# Patient Record
Sex: FEMALE | Race: WHITE | NOT HISPANIC OR LATINO | Employment: FULL TIME | URBAN - NONMETROPOLITAN AREA
[De-identification: names, ages, dates, MRNs, and addresses within clinical notes are randomized per-mention and may not be internally consistent; named-entity substitution may affect disease eponyms.]

---

## 2023-04-19 DIAGNOSIS — Z78.9 USES CONTRACEPTION: Primary | ICD-10-CM

## 2023-04-19 DIAGNOSIS — Z78.9 USES CONTRACEPTION: ICD-10-CM

## 2023-04-19 RX ORDER — DROSPIRENONE AND ETHINYL ESTRADIOL 0.03MG-3MG
1 KIT ORAL DAILY
Qty: 84 TABLET | Refills: 3 | Status: SHIPPED | OUTPATIENT
Start: 2023-04-19 | End: 2023-07-05 | Stop reason: SDUPTHER

## 2023-04-19 RX ORDER — DROSPIRENONE AND ETHINYL ESTRADIOL 0.03MG-3MG
1 KIT ORAL DAILY
Qty: 84 TABLET | Refills: 3 | Status: SHIPPED | OUTPATIENT
Start: 2023-04-19 | End: 2023-04-19 | Stop reason: SDUPTHER

## 2023-05-31 ENCOUNTER — OFFICE VISIT (OUTPATIENT)
Dept: PRIMARY CARE | Facility: CLINIC | Age: 43
End: 2023-05-31
Payer: COMMERCIAL

## 2023-05-31 VITALS
DIASTOLIC BLOOD PRESSURE: 78 MMHG | HEART RATE: 100 BPM | TEMPERATURE: 98 F | RESPIRATION RATE: 14 BRPM | HEIGHT: 63 IN | OXYGEN SATURATION: 97 % | BODY MASS INDEX: 32.05 KG/M2 | WEIGHT: 180.9 LBS | SYSTOLIC BLOOD PRESSURE: 135 MMHG

## 2023-05-31 DIAGNOSIS — E66.09 CLASS 1 OBESITY DUE TO EXCESS CALORIES WITHOUT SERIOUS COMORBIDITY WITH BODY MASS INDEX (BMI) OF 32.0 TO 32.9 IN ADULT: ICD-10-CM

## 2023-05-31 DIAGNOSIS — Z00.00 ENCOUNTER FOR WELLNESS EXAMINATION IN ADULT: Primary | ICD-10-CM

## 2023-05-31 DIAGNOSIS — Z12.4 SCREENING FOR CERVICAL CANCER: ICD-10-CM

## 2023-05-31 DIAGNOSIS — Z12.31 VISIT FOR SCREENING MAMMOGRAM: ICD-10-CM

## 2023-05-31 PROBLEM — E66.9 OBESITY, CLASS I, BMI 30-34.9: Status: ACTIVE | Noted: 2020-10-26

## 2023-05-31 PROBLEM — N92.0 MENORRHAGIA: Status: ACTIVE | Noted: 2023-05-31

## 2023-05-31 PROBLEM — E66.811 OBESITY, CLASS I, BMI 30-34.9: Status: ACTIVE | Noted: 2020-10-26

## 2023-05-31 PROBLEM — E66.9 OBESITY, CLASS I, BMI 30-34.9: Status: RESOLVED | Noted: 2020-10-26 | Resolved: 2023-05-31

## 2023-05-31 PROBLEM — E66.811 OBESITY, CLASS I, BMI 30-34.9: Status: RESOLVED | Noted: 2020-10-26 | Resolved: 2023-05-31

## 2023-05-31 PROBLEM — J30.9 ALLERGIC RHINITIS: Status: ACTIVE | Noted: 2023-05-31

## 2023-05-31 PROCEDURE — 87624 HPV HI-RISK TYP POOLED RSLT: CPT

## 2023-05-31 PROCEDURE — 3008F BODY MASS INDEX DOCD: CPT | Performed by: FAMILY MEDICINE

## 2023-05-31 PROCEDURE — 99396 PREV VISIT EST AGE 40-64: CPT | Performed by: FAMILY MEDICINE

## 2023-05-31 PROCEDURE — 88175 CYTOPATH C/V AUTO FLUID REDO: CPT

## 2023-05-31 PROCEDURE — 1036F TOBACCO NON-USER: CPT | Performed by: FAMILY MEDICINE

## 2023-05-31 RX ORDER — FLUTICASONE PROPIONATE 50 MCG
2 SPRAY, SUSPENSION (ML) NASAL
COMMUNITY
Start: 2020-02-01 | End: 2024-01-24

## 2023-05-31 RX ORDER — CALC/MAG/B COMPLEX/D3/HERB 61
15 TABLET ORAL
COMMUNITY
Start: 2020-10-26

## 2023-05-31 RX ORDER — CETIRIZINE HYDROCHLORIDE 10 MG/1
10 TABLET ORAL DAILY
COMMUNITY

## 2023-05-31 ASSESSMENT — PATIENT HEALTH QUESTIONNAIRE - PHQ9
SUM OF ALL RESPONSES TO PHQ9 QUESTIONS 1 AND 2: 0
2. FEELING DOWN, DEPRESSED OR HOPELESS: NOT AT ALL
1. LITTLE INTEREST OR PLEASURE IN DOING THINGS: NOT AT ALL

## 2023-05-31 NOTE — PROGRESS NOTES
"Subjective   Patient ID: Candice Javier is a 42 y.o. female who presents for Annual Exam.    HPI   Diet: well rounded, healthy  Supplements/vitamins: MV, elderberry  Alcohol use: Y, 1-2 drinks/week  Caffeine intake: 1 cup coffee/day  Exercise: active, nothing formal  Sleep: no issues  Last dental appointment: every 6mo  Last eye appointment: wears contact lenses, glasses. 1 year ago.   Anxiety/Depression: denies   LMP: 5/15/2023  Menstrual cycles: regular monthly periods  Contraception: OCPs  Last pap smear: 2020 negative   Mammogram: due   Fmhx breast cancer: Y, maternal great aunt  Cscope: n/a  Fmhx colon cancer: Y, maternal gma   Tobacco use/Lung cancer screening: N  Recreational drug use: N  Immunizations: up to date     Review of Systems   All other systems reviewed and are negative.    Objective   /78 (BP Location: Left arm, Patient Position: Sitting, BP Cuff Size: Large adult)   Pulse 100   Temp 36.7 °C (98 °F) (Temporal)   Resp 14   Ht 1.6 m (5' 3\")   Wt 82.1 kg (180 lb 14.4 oz)   LMP 05/15/2023   SpO2 97%   BMI 32.04 kg/m²     Physical Exam  Constitutional: Well developed, well nourished, alert and in no acute distress.  Head and Face: Normocephalic, atraumatic.  Eyes: Normal external exam. Pupils equally round and reactive to light with normal accommodation and extraocular movements intact.   ENT: External inspection of ears normal, tympanic membranes visualized and normal. Nasal mucosa, septum, and turbinates normal. Oral mucosa moist, oropharynx clear.   Neck: Supple, no lymphadenopathy or masses. Thyroid not enlarged, no palpable nodules.   Cardiovascular: Regular rate and rhythm, normal S1 and S2, no murmurs, gallops, or rubs. Radial pulses normal. No peripheral edema. No carotid bruits.   Pulmonary: No respiratory distress, lungs clear to auscultation bilaterally. No wheezes, rhonchi, rales.   Breasts: Normal appearance, no palpable masses or axillary lymphadenopathy, nipple discharge or " inversion, skin changes.   Abdomen: Soft, nontender, nondistended, normal bowel sounds. No masses palpated.   Genitourinary: External genitalia and vagina normal. Urethra normal. No suprapubic tenderness to palpation. No vaginal discharge or bleeding. Cervix normal. Uterus normal, no enlargement or masses palpated. Adnexa normal, no enlargement or masses palpated.  Musculoskeletal: Gait normal. Muscle strength/tone normal of all 4 extremities. Normal range of motion of all extremities.   Skin: Warm, well perfused, normal skin turgor and color, no lesions or rashes noted.   Neurologic: Cranial nerves II-XII grossly intact. Deep tendon reflexes were 2+ and symmetric. Sensation normal bilaterally.   Psychiatric: Mood calm and affect normal.      Assessment/Plan   Recommendations for women annual wellness exam:   Make sure screenings for cervical and breast cancer are up to date if applicable- pap smears age 71-24-ykexwfgea today.   Discuss mammogram starting at age 40 or sooner if positive family history of breast cancer-ordered.  STD screening   Follow a healthy diet (Dash diet, Mediterranean diet)  Exercise 150 min/wk   Maintain healthy weight (BMI < 25)-your BMI is 32.   Do not smoke   Alcohol in moderation (up to 1 drink/day)  Get enough sleep (7-8 hours/night)  Take a prenatal vitamin with folic acid if possibility of pregnancy   Make sure immunizations are up to date (influenza, Tdap)-up to date.   Premenopausal women need minimum 1,000 mg calcium and 600-800 IU vitamin D daily (combination of diet + supplement)  Talk to your physician if you have concerns about depression or anxiety  Visit dentist twice yearly  Colon Cancer Screening-n/a    Fasting labs ordered      Follow up in 1 year for CPE

## 2023-06-09 LAB
COMPLETE PATHOLOGY REPORT: NORMAL
CONVERTED CLINICAL DIAGNOSIS-HISTORY: NORMAL
CONVERTED DIAGNOSIS COMMENT: NORMAL
CONVERTED FINAL DIAGNOSIS: NORMAL
CONVERTED FINAL REPORT PDF LINK TO COPY AND PASTE: NORMAL

## 2023-07-05 DIAGNOSIS — Z78.9 USES CONTRACEPTION: ICD-10-CM

## 2023-07-06 RX ORDER — DROSPIRENONE AND ETHINYL ESTRADIOL 0.03MG-3MG
1 KIT ORAL DAILY
Qty: 84 TABLET | Refills: 3 | Status: SHIPPED | OUTPATIENT
Start: 2023-07-06 | End: 2023-07-08

## 2023-07-07 ENCOUNTER — TELEPHONE (OUTPATIENT)
Dept: PRIMARY CARE | Facility: CLINIC | Age: 43
End: 2023-07-07

## 2023-07-07 DIAGNOSIS — N92.0 MENORRHAGIA WITH REGULAR CYCLE: Primary | ICD-10-CM

## 2023-07-08 RX ORDER — DROSPIRENONE AND ETHINYL ESTRADIOL 0.03MG-3MG
1 KIT ORAL DAILY
Qty: 84 TABLET | Refills: 3 | Status: SHIPPED | OUTPATIENT
Start: 2023-07-08 | End: 2024-06-08

## 2023-09-14 DIAGNOSIS — R92.8 ABNORMAL MAMMOGRAM: ICD-10-CM

## 2023-09-14 DIAGNOSIS — N63.10 MASS OF RIGHT BREAST, UNSPECIFIED QUADRANT: Primary | ICD-10-CM

## 2023-10-09 ENCOUNTER — ANCILLARY PROCEDURE (OUTPATIENT)
Dept: RADIOLOGY | Facility: CLINIC | Age: 43
End: 2023-10-09
Payer: COMMERCIAL

## 2023-10-09 DIAGNOSIS — N63.10 MASS OF RIGHT BREAST, UNSPECIFIED QUADRANT: ICD-10-CM

## 2023-10-09 DIAGNOSIS — R92.8 ABNORMAL MAMMOGRAM: ICD-10-CM

## 2023-10-09 PROCEDURE — 77065 DX MAMMO INCL CAD UNI: CPT | Mod: LT

## 2023-10-09 PROCEDURE — 76642 ULTRASOUND BREAST LIMITED: CPT | Mod: LEFT SIDE | Performed by: RADIOLOGY

## 2023-10-09 PROCEDURE — 76642 ULTRASOUND BREAST LIMITED: CPT | Mod: RT

## 2023-10-09 PROCEDURE — 77066 DX MAMMO INCL CAD BI: CPT | Mod: LEFT SIDE | Performed by: RADIOLOGY

## 2023-10-09 PROCEDURE — 77062 BREAST TOMOSYNTHESIS BI: CPT | Mod: LEFT SIDE | Performed by: RADIOLOGY

## 2023-10-09 PROCEDURE — 76982 USE 1ST TARGET LESION: CPT | Mod: LEFT SIDE | Performed by: RADIOLOGY

## 2023-10-11 DIAGNOSIS — N63.10 MASS OF RIGHT BREAST, UNSPECIFIED QUADRANT: Primary | ICD-10-CM

## 2024-01-24 ENCOUNTER — OFFICE VISIT (OUTPATIENT)
Dept: PRIMARY CARE | Facility: CLINIC | Age: 44
End: 2024-01-24
Payer: COMMERCIAL

## 2024-01-24 VITALS
SYSTOLIC BLOOD PRESSURE: 118 MMHG | TEMPERATURE: 98.6 F | DIASTOLIC BLOOD PRESSURE: 73 MMHG | BODY MASS INDEX: 31.28 KG/M2 | HEART RATE: 104 BPM | OXYGEN SATURATION: 97 % | WEIGHT: 176.6 LBS

## 2024-01-24 DIAGNOSIS — J01.10 ACUTE NON-RECURRENT FRONTAL SINUSITIS: Primary | ICD-10-CM

## 2024-01-24 PROCEDURE — 3008F BODY MASS INDEX DOCD: CPT | Performed by: FAMILY MEDICINE

## 2024-01-24 PROCEDURE — 1036F TOBACCO NON-USER: CPT | Performed by: FAMILY MEDICINE

## 2024-01-24 PROCEDURE — 99215 OFFICE O/P EST HI 40 MIN: CPT | Performed by: FAMILY MEDICINE

## 2024-01-24 RX ORDER — DOXYCYCLINE 100 MG/1
100 CAPSULE ORAL 2 TIMES DAILY
Qty: 20 CAPSULE | Refills: 0 | Status: SHIPPED | OUTPATIENT
Start: 2024-01-24 | End: 2024-02-03

## 2024-01-24 ASSESSMENT — ENCOUNTER SYMPTOMS
DYSURIA: 0
COUGH: 1
ABDOMINAL PAIN: 0
NAUSEA: 0
DIARRHEA: 0
SORE THROAT: 0
WHEEZING: 0
VOMITING: 0
FEVER: 1
HEADACHES: 1

## 2024-01-24 NOTE — PROGRESS NOTES
" Subjective   Patient ID: Deja Javier \"Hong" is a 43 y.o. female who presents for URI (Congestion, Ear Pain, Cough, Fever, Chills. Has been going on since Monday ).  Fever   The current episode started in the past 7 days. The problem occurs constantly. The problem has been unchanged. The maximum temperature noted was 101 to 101.9 F. The temperature was taken using a tympanic thermometer. Associated symptoms include congestion, coughing, ear pain, headaches, muscle aches and sleepiness. Pertinent negatives include no abdominal pain, chest pain, diarrhea, nausea, rash, sore throat, urinary pain, vomiting or wheezing.     Sxs as in nurse note.   Not feeling well.  No known ill contacts. Taking mucinex, decongestant, not helping.   Coughing up a lot of phlegm. Ears hurt on/off   Review of Systems   Constitutional:  Positive for fever.   HENT:  Positive for congestion and ear pain. Negative for sore throat.    Respiratory:  Positive for cough. Negative for wheezing.    Cardiovascular:  Negative for chest pain.   Gastrointestinal:  Negative for abdominal pain, diarrhea, nausea and vomiting.   Genitourinary:  Negative for dysuria.   Skin:  Negative for rash.   Neurological:  Positive for headaches.       Objective   /73 (BP Location: Right arm, Patient Position: Sitting, BP Cuff Size: Adult)   Pulse 104   Temp 37 °C (98.6 °F) (Temporal)   Wt 80.1 kg (176 lb 9.6 oz)   SpO2 97%   BMI 31.28 kg/m²     Physical Exam  Constitutional:       Appearance: She is not ill-appearing.   HENT:      Head:      Comments: Facial pain left maxillary, bilateral frontal.      Ears:      Comments: Fluid behind ear drums bilaterally      Nose: Congestion present.      Mouth/Throat:      Pharynx: Posterior oropharyngeal erythema present. No oropharyngeal exudate.   Eyes:      Extraocular Movements: Extraocular movements intact.      Conjunctiva/sclera: Conjunctivae normal.      Pupils: Pupils are equal, round, and reactive to " light.   Cardiovascular:      Rate and Rhythm: Normal rate and regular rhythm.   Pulmonary:      Effort: No respiratory distress.      Breath sounds: No wheezing or rales.   Musculoskeletal:      Cervical back: No rigidity.   Lymphadenopathy:      Cervical: Cervical adenopathy present.   Neurological:      Mental Status: She is alert.         Assessment/Plan   Problem List Items Addressed This Visit    None  Visit Diagnoses       Acute non-recurrent frontal sinusitis    -  Primary    Relevant Medications    doxycycline (Vibramycin) 100 mg capsule        Recommend use flonase regularly.  Decrease mucinex to once a day, during day       Followup if not improving.     FRANCOISE Goins MD

## 2024-01-24 NOTE — PROGRESS NOTES
Answers submitted by the patient for this visit:  Fever Questionnaire (Submitted on 1/24/2024)  Chief Complaint: Fever  Onset: in the past 7 days  Frequency: constantly  Progression since onset: unchanged  Max temp prior to arrival: 101 to 101.9 F  Temperature source: a tympanic thermometer  abdominal pain: No  chest pain: No  congestion: Yes  cough: Yes  diarrhea: No  ear pain: Yes  headaches: Yes  muscle aches: Yes  nausea: No  rash: No  sleepiness: Yes  sore throat: No  urinary pain: No  vomiting: No  wheezing: No

## 2024-04-08 ENCOUNTER — HOSPITAL ENCOUNTER (OUTPATIENT)
Dept: RADIOLOGY | Facility: CLINIC | Age: 44
Discharge: HOME | End: 2024-04-08
Payer: COMMERCIAL

## 2024-04-08 ENCOUNTER — LAB (OUTPATIENT)
Dept: LAB | Facility: LAB | Age: 44
End: 2024-04-08
Payer: COMMERCIAL

## 2024-04-08 DIAGNOSIS — N63.10 MASS OF RIGHT BREAST, UNSPECIFIED QUADRANT: Primary | ICD-10-CM

## 2024-04-08 DIAGNOSIS — N63.10 MASS OF RIGHT BREAST, UNSPECIFIED QUADRANT: ICD-10-CM

## 2024-04-08 DIAGNOSIS — Z00.00 ENCOUNTER FOR WELLNESS EXAMINATION IN ADULT: ICD-10-CM

## 2024-04-08 LAB
ALBUMIN SERPL BCP-MCNC: 3.7 G/DL (ref 3.4–5)
ALP SERPL-CCNC: 75 U/L (ref 33–110)
ALT SERPL W P-5'-P-CCNC: 10 U/L (ref 7–45)
ANION GAP SERPL CALC-SCNC: 14 MMOL/L (ref 10–20)
AST SERPL W P-5'-P-CCNC: 10 U/L (ref 9–39)
BILIRUB SERPL-MCNC: 0.2 MG/DL (ref 0–1.2)
BUN SERPL-MCNC: 12 MG/DL (ref 6–23)
CALCIUM SERPL-MCNC: 9 MG/DL (ref 8.6–10.6)
CHLORIDE SERPL-SCNC: 106 MMOL/L (ref 98–107)
CHOLEST SERPL-MCNC: 164 MG/DL (ref 0–199)
CHOLESTEROL/HDL RATIO: 2.3
CO2 SERPL-SCNC: 27 MMOL/L (ref 21–32)
CREAT SERPL-MCNC: 0.69 MG/DL (ref 0.5–1.05)
EGFRCR SERPLBLD CKD-EPI 2021: >90 ML/MIN/1.73M*2
ERYTHROCYTE [DISTWIDTH] IN BLOOD BY AUTOMATED COUNT: 13.6 % (ref 11.5–14.5)
EST. AVERAGE GLUCOSE BLD GHB EST-MCNC: 108 MG/DL
GLUCOSE SERPL-MCNC: 85 MG/DL (ref 74–99)
HBA1C MFR BLD: 5.4 %
HCT VFR BLD AUTO: 38.2 % (ref 36–46)
HDLC SERPL-MCNC: 70.2 MG/DL
HGB BLD-MCNC: 11.6 G/DL (ref 12–16)
LDLC SERPL CALC-MCNC: 64 MG/DL
MCH RBC QN AUTO: 24.7 PG (ref 26–34)
MCHC RBC AUTO-ENTMCNC: 30.4 G/DL (ref 32–36)
MCV RBC AUTO: 81 FL (ref 80–100)
NON HDL CHOLESTEROL: 94 MG/DL (ref 0–149)
NRBC BLD-RTO: 0 /100 WBCS (ref 0–0)
PLATELET # BLD AUTO: 420 X10*3/UL (ref 150–450)
POTASSIUM SERPL-SCNC: 4.7 MMOL/L (ref 3.5–5.3)
PROT SERPL-MCNC: 6.3 G/DL (ref 6.4–8.2)
RBC # BLD AUTO: 4.7 X10*6/UL (ref 4–5.2)
SODIUM SERPL-SCNC: 142 MMOL/L (ref 136–145)
TRIGL SERPL-MCNC: 148 MG/DL (ref 0–149)
VLDL: 30 MG/DL (ref 0–40)
WBC # BLD AUTO: 7.5 X10*3/UL (ref 4.4–11.3)

## 2024-04-08 PROCEDURE — 76641 ULTRASOUND BREAST COMPLETE: CPT | Mod: RT

## 2024-04-08 PROCEDURE — 36415 COLL VENOUS BLD VENIPUNCTURE: CPT

## 2024-04-08 PROCEDURE — 83036 HEMOGLOBIN GLYCOSYLATED A1C: CPT

## 2024-04-08 PROCEDURE — 80053 COMPREHEN METABOLIC PANEL: CPT

## 2024-04-08 PROCEDURE — 80061 LIPID PANEL: CPT

## 2024-04-08 PROCEDURE — 85027 COMPLETE CBC AUTOMATED: CPT

## 2024-04-10 DIAGNOSIS — Z12.31 SCREENING MAMMOGRAM FOR HIGH-RISK PATIENT: ICD-10-CM

## 2024-06-08 DIAGNOSIS — N92.0 MENORRHAGIA WITH REGULAR CYCLE: ICD-10-CM

## 2024-06-08 RX ORDER — DROSPIRENONE AND ETHINYL ESTRADIOL 0.03MG-3MG
1 KIT ORAL DAILY
Qty: 28 TABLET | Refills: 0 | Status: SHIPPED | OUTPATIENT
Start: 2024-06-08

## 2024-06-14 ENCOUNTER — APPOINTMENT (OUTPATIENT)
Dept: PRIMARY CARE | Facility: CLINIC | Age: 44
End: 2024-06-14
Payer: COMMERCIAL

## 2024-06-18 ENCOUNTER — TELEMEDICINE (OUTPATIENT)
Dept: PRIMARY CARE | Facility: EXTERNAL LOCATION | Age: 44
End: 2024-06-18
Payer: COMMERCIAL

## 2024-06-18 DIAGNOSIS — N30.01 ACUTE CYSTITIS WITH HEMATURIA: Primary | ICD-10-CM

## 2024-06-18 RX ORDER — NITROFURANTOIN 25; 75 MG/1; MG/1
100 CAPSULE ORAL 2 TIMES DAILY
Qty: 10 CAPSULE | Refills: 0 | Status: SHIPPED | OUTPATIENT
Start: 2024-06-18 | End: 2024-06-23

## 2024-06-18 ASSESSMENT — ENCOUNTER SYMPTOMS
SINUS PRESSURE: 0
MYALGIAS: 0
SORE THROAT: 0
FREQUENCY: 1
FLANK PAIN: 1
DIARRHEA: 0
NAUSEA: 0
RHINORRHEA: 0
COUGH: 0
FEVER: 0
DYSURIA: 1
APPETITE CHANGE: 0
ABDOMINAL PAIN: 0
CHILLS: 0
DIZZINESS: 0
VOMITING: 0
ACTIVITY CHANGE: 0
HEADACHES: 0
HEMATURIA: 1
ABDOMINAL DISTENTION: 0
SINUS PAIN: 0
FATIGUE: 0

## 2024-06-18 NOTE — PROGRESS NOTES
Subjective   Patient ID: Candice Javier is a 43 y.o. female who presents for UTI s/s.     Pt presents for c/o UTI like s/s.       An interactive audio telecommunication system which permits real time communications between the patient (at the originating site) and provider (at the distant site) was utilized to provide this telehealth service.   Verbal consent was requested and obtained from Deja Javier on this date, 6/18/24 for a telehealth visit. Patient was made aware this will be a full appointment with the provider via telecommunications. Patient verbalized understanding and is agreeable.   Patients current location was confirmed to be in the [facility].     UTI   This is a new problem. The current episode started today. The quality of the pain is described as burning. The pain is at a severity of 0/10. There has been no fever. She is Sexually active. Associated symptoms include flank pain, frequency, hematuria and urgency. Pertinent negatives include no chills, discharge, hesitancy, nausea, possible pregnancy or vomiting. She has tried nothing for the symptoms. There is no history of recurrent UTIs.        Review of Systems   Constitutional:  Negative for activity change, appetite change, chills, fatigue and fever.   HENT:  Negative for congestion, rhinorrhea, sinus pressure, sinus pain and sore throat.    Respiratory:  Negative for cough.    Cardiovascular:  Negative for chest pain.   Gastrointestinal:  Negative for abdominal distention, abdominal pain, diarrhea, nausea and vomiting.   Endocrine: Positive for polyuria.   Genitourinary:  Positive for dysuria, flank pain, frequency, hematuria and urgency. Negative for decreased urine volume, hesitancy, pelvic pain, vaginal bleeding, vaginal discharge and vaginal pain.   Musculoskeletal:  Negative for myalgias.   Neurological:  Negative for dizziness and headaches.       Objective   There were no vitals taken for this visit.    Physical  Exam  Constitutional:       General: She is not in acute distress.  Neurological:      Mental Status: She is alert.   Psychiatric:         Mood and Affect: Mood normal.         Behavior: Behavior normal.         Thought Content: Thought content normal.         Judgment: Judgment normal.     Physical exam limited d/t phone only appointment.     Assessment/Plan   Diagnoses and all orders for this visit:  Acute cystitis with hematuria  -     nitrofurantoin, macrocrystal-monohydrate, (Macrobid) 100 mg capsule; Take 1 capsule (100 mg) by mouth 2 times a day for 5 days.    Acute.      Start ATB - please complete full course unless you hear otherwise from our office.  Discussed indication for antibiotic use, administration instructions, and adverse effects with patient.  Increase your fluid intake, Tylenol or Motrin as needed for pain or fever.  May use OTC AZO/UROSTAT/Cystex for symptoms relief if desired.   Discussed hygiene for prevention.  Follow up with PCP if no improvement in 2-3 days.

## 2024-06-30 DIAGNOSIS — N92.0 MENORRHAGIA WITH REGULAR CYCLE: ICD-10-CM

## 2024-07-01 RX ORDER — DROSPIRENONE AND ETHINYL ESTRADIOL 0.03MG-3MG
1 KIT ORAL DAILY
Qty: 84 TABLET | Refills: 1 | Status: SHIPPED | OUTPATIENT
Start: 2024-07-01

## 2024-08-03 ENCOUNTER — APPOINTMENT (OUTPATIENT)
Dept: PRIMARY CARE | Facility: CLINIC | Age: 44
End: 2024-08-03
Payer: COMMERCIAL

## 2024-08-03 VITALS
RESPIRATION RATE: 14 BRPM | WEIGHT: 179.7 LBS | BODY MASS INDEX: 30.68 KG/M2 | TEMPERATURE: 97.2 F | HEART RATE: 82 BPM | HEIGHT: 64 IN | DIASTOLIC BLOOD PRESSURE: 86 MMHG | SYSTOLIC BLOOD PRESSURE: 130 MMHG | OXYGEN SATURATION: 96 %

## 2024-08-03 DIAGNOSIS — E66.09 CLASS 1 OBESITY DUE TO EXCESS CALORIES WITHOUT SERIOUS COMORBIDITY WITH BODY MASS INDEX (BMI) OF 30.0 TO 30.9 IN ADULT: ICD-10-CM

## 2024-08-03 DIAGNOSIS — Z00.00 ENCOUNTER FOR WELLNESS EXAMINATION IN ADULT: Primary | ICD-10-CM

## 2024-08-03 DIAGNOSIS — N93.9 ABNORMAL UTERINE BLEEDING: ICD-10-CM

## 2024-08-03 DIAGNOSIS — D64.9 LOW HEMOGLOBIN: ICD-10-CM

## 2024-08-03 PROCEDURE — 3008F BODY MASS INDEX DOCD: CPT | Performed by: FAMILY MEDICINE

## 2024-08-03 PROCEDURE — 99396 PREV VISIT EST AGE 40-64: CPT | Performed by: FAMILY MEDICINE

## 2024-08-03 PROCEDURE — 1036F TOBACCO NON-USER: CPT | Performed by: FAMILY MEDICINE

## 2024-08-03 ASSESSMENT — PATIENT HEALTH QUESTIONNAIRE - PHQ9
2. FEELING DOWN, DEPRESSED OR HOPELESS: NOT AT ALL
SUM OF ALL RESPONSES TO PHQ9 QUESTIONS 1 AND 2: 0
1. LITTLE INTEREST OR PLEASURE IN DOING THINGS: NOT AT ALL

## 2024-08-03 NOTE — PROGRESS NOTES
"Subjective   Patient ID: Candice Javier is a 43 y.o. female who presents for Annual Exam (Discuss birth control- maybe a new medication).    HPI   Diet: well balanced, portion control (every plate 3x/week)  Supplements/vitamins: MV, elderberry  Alcohol use: Y, 1-2 drinks/week  Caffeine intake: 1 cup coffee/day  Exercise: active, nothing formal  Sleep: no issues  Last dental appointment: every 6mo  Last eye appointment: wears contact lenses, glasses. 1 year ago.   Anxiety/Depression: denies   Menstrual cycles: regular monthly periods  Contraception: OCPs-monthly periods   Last pap smear: 2020 negative   Mammogram: ordered in 4/2024 for dx/US, follow up scheduled in Sept at [place]. Followed by breast specialist.  Fmhx breast cancer: Y, maternal great aunt  Cscope: n/a  Fmhx colon cancer: Y, maternal gma   Tobacco use/Lung cancer screening: N  Recreational drug use: N  Immunizations: up to date     Review of Systems   All other systems reviewed and are negative.    Objective   /86 (BP Location: Left arm, Patient Position: Sitting, BP Cuff Size: Adult)   Pulse 82   Temp 36.2 °C (97.2 °F) (Temporal)   Resp 14   Ht 1.626 m (5' 4\")   Wt 81.5 kg (179 lb 11.2 oz)   LMP 07/09/2024   SpO2 96%   BMI 30.85 kg/m²     Physical Exam  Constitutional: Well developed, well nourished, alert and in no acute distress.  Head and Face: Normocephalic, atraumatic.  Eyes: Normal external exam. Pupils equally round and reactive to light with normal accommodation and extraocular movements intact.   ENT: External inspection of ears normal, tympanic membranes visualized and normal. Nasal mucosa, septum, and turbinates normal. Oral mucosa moist, oropharynx clear.   Neck: Supple, no lymphadenopathy or masses. Thyroid not enlarged, no palpable nodules.   Cardiovascular: Regular rate and rhythm, normal S1 and S2, no murmurs, gallops, or rubs. Radial pulses normal. No peripheral edema.   Pulmonary: No respiratory distress, lungs clear " to auscultation bilaterally. No wheezes, rhonchi, rales.   Breasts: Normal appearance, no palpable masses or axillary lymphadenopathy, nipple discharge or inversion, skin changes.   Abdomen: Soft, nontender, nondistended, normal bowel sounds. No masses palpated.   Genitourinary: External genitalia and vagina normal. Urethra normal. No suprapubic tenderness to palpation. No vaginal discharge or bleeding. Cervix normal. Uterus normal, no enlargement or masses palpated. Adnexa normal, no enlargement or masses palpated.  Musculoskeletal: Gait normal. Muscle strength/tone normal of all 4 extremities. Normal range of motion of all extremities.   Skin: Warm, well perfused, normal skin turgor and color, no lesions or rashes noted.   Neurologic: Cranial nerves II-XII grossly intact. Deep tendon reflexes were 2+ and symmetric. Sensation normal bilaterally.   Psychiatric: Mood calm and affect normal.    Assessment/Plan   Recommendations for women annual wellness exam:   Make sure screenings for cervical and breast cancer are up to date if applicable- pap smears age 63-35-[id number] today.   Discuss mammogram starting at age 40 or sooner if positive family history of breast cancer-ordered.  STD screening   Follow a healthy diet (Dash diet, Mediterranean diet)  Exercise 150 min/wk   Maintain healthy weight (BMI < 25)-your BMI is 30.   Do not smoke   Alcohol in moderation (up to 1 drink/day)  Get enough sleep (7-8 hours/night)  Take a prenatal vitamin with folic acid if possibility of pregnancy   Make sure immunizations are up to date (influenza, Tdap)-up to date.   Premenopausal women need minimum 1,000 mg calcium and 600-800 IU vitamin D daily (combination of diet + supplement)  Talk to your physician if you have concerns about depression or anxiety  Visit dentist twice yearly  Colon Cancer Screening-n/a      Abnormal vaginal bleeding  Change birth control to continuous (skip placebo week), may have a period every 3-4 months as  desired.  If bleeding persists, we will check TSH on blood work and obtain a pelvic ultrasound        Follow up in 1 year for CPE

## 2024-09-16 DIAGNOSIS — N92.0 MENORRHAGIA WITH REGULAR CYCLE: ICD-10-CM

## 2024-09-16 RX ORDER — DROSPIRENONE AND ETHINYL ESTRADIOL 0.03MG-3MG
1 KIT ORAL DAILY
Qty: 180 TABLET | Refills: 0 | Status: SHIPPED | OUTPATIENT
Start: 2024-09-16 | End: 2025-03-15

## 2024-09-23 ENCOUNTER — TELEMEDICINE (OUTPATIENT)
Dept: PRIMARY CARE | Facility: CLINIC | Age: 44
End: 2024-09-23
Payer: COMMERCIAL

## 2024-09-23 DIAGNOSIS — J02.0 STREP THROAT: Primary | ICD-10-CM

## 2024-09-23 PROCEDURE — 1036F TOBACCO NON-USER: CPT | Performed by: NURSE PRACTITIONER

## 2024-09-23 PROCEDURE — 99214 OFFICE O/P EST MOD 30 MIN: CPT | Performed by: NURSE PRACTITIONER

## 2024-09-23 RX ORDER — METHYLPREDNISOLONE 4 MG/1
TABLET ORAL
Qty: 21 TABLET | Refills: 0 | Status: SHIPPED | OUTPATIENT
Start: 2024-09-23

## 2024-09-23 RX ORDER — AZITHROMYCIN 250 MG/1
TABLET, FILM COATED ORAL
Qty: 6 TABLET | Refills: 0 | Status: SHIPPED | OUTPATIENT
Start: 2024-09-23 | End: 2024-09-27

## 2024-09-23 NOTE — ASSESSMENT & PLAN NOTE
Meets [facility] criteria 4/4 will treat for strep  - azithromycin x 5 days  - medrol dose pack  -  Infection prevention and hand hygiene discussed  -  May continue Tylenol over the counter  -  red flags including difficulty swallowing or drooling  - note for work written

## 2024-09-23 NOTE — LETTER
September 23, 2024     Patient: Deja Javier   YOB: 1980   Date of Visit: 9/23/2024       To Whom It May Concern:    Deja Javier was seen in my clinic on 9/23/2024 at 8:40 am. Please excuse Deja for her absence from work on this day to make the appointment.  May return to work 9/25/24    If you have any questions or concerns, please don't hesitate to call.         Sincerely,         ABIODUN Amato-CNP        CC: No Recipients

## 2024-09-23 NOTE — PROGRESS NOTES
Subjective   Patient ID: Candice Javier is a 43 y.o. female who presents for Illness (/).    This visit was completed via [employer] due to the restrictions of the COVID-19 pandemic. All issues as below were discussed and addressed but no physical exam was performed. If it was felt that the patient should be evaluated in clinic than they were directed there. The patient verbally consented to the visit.  Patient is located in [place] and verified   Presents with complaints of ear pain and sore throat x 5 days.  Denies congestion or rhinorrhea or cough  Painful to swallow - swollen lymph nodes bilaterally - worse to right  Temp is 100.4  Works as manufacture in research and [employer]  She has tried advil for the fever   She has taken mucinex DM with minimal relief           Review of Systems   All other systems reviewed and are negative.      Objective   There were no vitals taken for this visit.    Physical Exam    Assessment/Plan   Problem List Items Addressed This Visit             ICD-10-CM    Strep throat - Primary J02.0     Meets [facility] criteria 4/4 will treat for strep  - azithromycin x 5 days  - medrol dose pack  -  Infection prevention and hand hygiene discussed  -  May continue Tylenol over the counter  -  red flags including difficulty swallowing or drooling  - note for work written

## 2024-10-03 NOTE — PROGRESS NOTES
"Deja Javier female   1980 44 y.o.   [id number]      Chief Complaint    New Patient Visit          [facility] [place]  Deja Javier \"Hong" is a 44 y.o.  female [facility] DO [facility]. She denies breast surgery or biopsy. She has family history of breast cancer in maternal great aunt.     BREAST IMAGIN2023 bilateral screening mammogram, BI-RADS Category 0, right breast medial subareolar mass.  10/9/2023 right breast ultrasound, Bi-RADS Category 3, right breast 4:00 1cm from nipple just beneath dermins 1.1 x 0.5 1.1cm avascular mass.   2024 right breast ultrasound, BI-RADS Category 3, 4:00 1cm from nipple 1.2 x 0.5 x 1.3cm stable probable benign mass.     REPRODUCTIVE HISTORY: menarche age 12, , first birth age 28  pre/perimenopausal, current OCPs, heterogeneously dense breast tissue     FAMILY CANCER HISTORY:   Maternal GREAT aunt: breast cancer age 60  Maternal grandmother: colon cancer  Paternal grandfather:  prostate cancer    REVIEW OF SYSTEMS    Constitutional:  Negative for appetite change, fatigue, fever and unexpected weight change.   HENT:  Negative for ear pain, hearing loss, nosebleeds, sore throat and trouble swallowing.    Eyes:  Negative for discharge, itching and visual disturbance.   Respiratory:  Negative for cough, chest tightness and shortness of breath.    Cardiovascular:  Negative for chest pain, palpitations and leg swelling.   Breast: as indicated in HPI  Gastrointestinal:  Negative for abdominal pain, constipation, diarrhea and nausea.   Endocrine: Negative for cold intolerance and heat intolerance.   Genitourinary:  Negative for dysuria, frequency, hematuria, pelvic pain and vaginal bleeding.   Musculoskeletal:  Negative for arthralgias, back pain, gait problem, joint swelling and myalgias.   Skin:  Negative for color change and rash.   Allergic/Immunologic: Negative for environmental allergies and food " allergies.   Neurological:  Negative for dizziness, tremors, speech difficulty, weakness, numbness and headaches.   Hematological:  Does not bruise/bleed easily.   Psychiatric/Behavioral:  Negative for agitation, dysphoric mood and sleep disturbance. The patient is not nervous/anxious.         MEDICATIONS  Current Outpatient Medications   Medication Instructions    cetirizine (ZYRTEC) 10 mg, oral, Daily    drospirenone-ethinyl estradioL (Katie, Ocella) 3-0.03 mg tablet 1 tablet, oral, Daily    lansoprazole (PREVACID) 15 mg, oral, Daily RT    loratadine-pseudoephedrine (Claritin-D 24-hour)  mg 24 hr tablet 1 tablet, oral, Daily, Do not crush, chew, or split.        ALLERGIES  Allergies   Allergen Reactions    Penicillins Hives    Sulfa (Sulfonamide Antibiotics) Hives        Patient Active Problem List    Diagnosis Date Noted    Strep throat 2024    Allergic rhinitis 2023    Menorrhagia 2023     Past Medical History:   Diagnosis Date    Allergic     Headache     Varicella 1989      Past Surgical History:   Procedure Laterality Date     SECTION, LOW TRANSVERSE  2018    OTHER SURGICAL HISTORY  2022     section    TUBAL LIGATION  2018    WISDOM TOOTH EXTRACTION        Family History   Problem Relation Name Age of Onset    Other (PMR) Mother      Hypertension Father Dayne     Hyperlipidemia Father Dayne     Diabetes Father Dayne     Colon cancer Maternal Grandmother Joi     Miscarriages / Stillbirths Maternal Grandmother Joi     Cancer Maternal Grandfather Manas     Cancer Paternal Grandfather Ward     Hypertension Paternal Grandfather Ward     Breast cancer Mother's Sister Lacy     Miscarriages / Stillbirths Mother's Sister Lacy           SOCIAL HISTORY      Social History     Tobacco Use    Smoking status: Never     Passive exposure: Never    Smokeless tobacco: Never   Substance Use Topics    Alcohol use: Yes     Alcohol/week: 2.0 standard  drinks of alcohol     Types: 1 Shots of liquor, 1 Standard drinks or equivalent per week     Comment: Social        VITALS  Vitals:    10/04/24 1204   BP: 115/81   Pulse: 98   Temp: 36.8 °C (98.3 °F)        PHYSICAL EXAM  Patient is alert and oriented x3, with appropriate mood. The gait is steady and hand grasps are equal. Sclera clear. The breasts are nearly symmetrical. The tissue is soft without palpable abnormalities, discrete nodules or masses. The skin and nipples appear normal. There is no cervical, supraclavicular, or axillary lymphadenopathy palpable. Heart rate and rhythm normal, S1 and S2 appreciated. The lungs are clear bilaterally. Abdomen is soft & non-tender.    Physical Exam     IMAGING  BI mammo bilateral diagnostic tomosynthesis 10/04/2024  BI US breast limited right 10/04/2024    Narrative  Interpreted By:  Nilsa Oconnell,  STUDY:  BI MAMMO BILATERAL DIAGNOSTIC TOMOSYNTHESIS; BI US BREAST LIMITED  RIGHT;  10/4/2024 11:27 am; 10/4/2024 11:41 am    ACCESSION NUMBER(S):  [id number]; [id number]    ORDERING CLINICIAN:  EVELIO BHATT    INDICATION:  Annual mammogram. 12 month follow-up of a probably benign right  breast mass.    ,Z12.31 Encounter for screening mammogram for malignant neoplasm of  breast    COMPARISON:  04/08/2024, 10/09/2023, 09/01/2023.    FINDINGS:  MAMMOGRAPHY: 2D and tomosynthesis images were reviewed at 1 mm slice  thickness.    Density:  The breasts are heterogeneously dense, which may obscure  small masses.    A stable circumscribed mass is seen in the right subareolar region.  No suspicious masses or calcifications are identified in the left  breast.    ULTRASOUND: Targeted ultrasound was performed of the right breast by  a registered sonographer with elastography. An oval circumscribed  hypoechoic mass is seen at 4 o'clock 1 cm from the nipple measuring  1.1 x 0.6 x 1.1 cm, previously measuring 1.1 x 0.5 x 1.1 cm. There is  no internal vascularity. It is soft on  elastography. This has been  stable since 10/09/2023.    Impression  Stable benign mass in the right breast demonstrating 1 year  stability. Final ultrasound follow-up is recommended in 1 year at the  time of next annual bilateral mammogram.    No mammographic evidence of malignancy in the left breast.    BI-RADS Category:  3 Probably Benign.  Recommendation:  Short Interval Follow-up.  Recommended Date:  1 Year.  Laterality:  Bilateral.        Time was spent viewing digital images of the radiology testing with the patient. I explained the results in depth, along with suggested explanation for follow up recommendations based on the testing results.          ORDERS  Orders Placed This Encounter   Procedures    BI US breast limited right     Standing Status:   Future     Standing Expiration Date:   12/4/2025     Order Specific Question:   Reason for exam:     Answer:   right     Order Specific Question:   Radiologist to Determine Optimal Study     Answer:   Yes     Order Specific Question:   Release result to [facility]     Answer:   Immediate    BI mammo bilateral diagnostic tomosynthesis     Standing Status:   Future     Standing Expiration Date:   12/4/2025     Order Specific Question:   Perform a breast ultrasound if clinically indicated by Radiologist?     Answer:   Yes     Order Specific Question:   Previous Mamm performed at  location?     Answer:   Yes     Order Specific Question:   Reason for exam:     Answer:   right mass     Order Specific Question:   Radiologist to Determine Optimal Study     Answer:   Yes     Order Specific Question:   Release result to [facility]     Answer:   Immediate [1]     Order Specific Question:   Is this exam part of a Research Study? If Yes, link this order to the research study     Answer:   No     Order Specific Question:   Is the patient pregnant?     Answer:   No          ASSESSMENT/PLAN  1. Mass of right breast, unspecified quadrant  Referral to High Risk Breast Cancer    BI US  breast limited right    Clinic Appointment Request    BI mammo bilateral diagnostic tomosynthesis    CANCELED: BI mammo bilateral diagnostic tomosynthesis             Follow up in about 1 year (around 10/4/2025) for with recommended breast imaging and exam. She is not at high risk for breast cancer, she is at average.       Elsa Coburn, [facility] [place]

## 2024-10-04 ENCOUNTER — HOSPITAL ENCOUNTER (OUTPATIENT)
Dept: RADIOLOGY | Facility: CLINIC | Age: 44
Discharge: HOME | End: 2024-10-04
Payer: COMMERCIAL

## 2024-10-04 ENCOUNTER — OFFICE VISIT (OUTPATIENT)
Dept: SURGICAL ONCOLOGY | Facility: CLINIC | Age: 44
End: 2024-10-04
Payer: COMMERCIAL

## 2024-10-04 VITALS
HEART RATE: 98 BPM | SYSTOLIC BLOOD PRESSURE: 115 MMHG | TEMPERATURE: 98.3 F | BODY MASS INDEX: 30.21 KG/M2 | WEIGHT: 176 LBS | DIASTOLIC BLOOD PRESSURE: 81 MMHG

## 2024-10-04 VITALS — BODY MASS INDEX: 30.73 KG/M2 | HEIGHT: 64 IN | WEIGHT: 180 LBS

## 2024-10-04 DIAGNOSIS — Z12.31 ENCOUNTER FOR SCREENING MAMMOGRAM FOR HIGH-RISK PATIENT: ICD-10-CM

## 2024-10-04 DIAGNOSIS — N63.10 MASS OF RIGHT BREAST, UNSPECIFIED QUADRANT: ICD-10-CM

## 2024-10-04 PROCEDURE — 77062 BREAST TOMOSYNTHESIS BI: CPT

## 2024-10-04 PROCEDURE — 76642 ULTRASOUND BREAST LIMITED: CPT | Mod: RT

## 2024-10-04 PROCEDURE — 76982 USE 1ST TARGET LESION: CPT | Mod: RT

## 2024-10-04 PROCEDURE — 99214 OFFICE O/P EST MOD 30 MIN: CPT | Performed by: NURSE PRACTITIONER

## 2024-10-04 PROCEDURE — 1036F TOBACCO NON-USER: CPT | Performed by: NURSE PRACTITIONER

## 2024-10-04 PROCEDURE — 99204 OFFICE O/P NEW MOD 45 MIN: CPT | Performed by: NURSE PRACTITIONER

## 2024-10-04 ASSESSMENT — PAIN SCALES - GENERAL: PAINLEVEL: 0-NO PAIN

## 2024-11-16 ENCOUNTER — LAB (OUTPATIENT)
Dept: LAB | Facility: LAB | Age: 44
End: 2024-11-16
Payer: COMMERCIAL

## 2024-11-16 DIAGNOSIS — D64.9 LOW HEMOGLOBIN: ICD-10-CM

## 2024-11-16 DIAGNOSIS — N93.9 ABNORMAL UTERINE BLEEDING: ICD-10-CM

## 2024-11-16 LAB
ERYTHROCYTE [DISTWIDTH] IN BLOOD BY AUTOMATED COUNT: 13.6 % (ref 11.5–14.5)
HCT VFR BLD AUTO: 39.2 % (ref 36–46)
HGB BLD-MCNC: 12.2 G/DL (ref 12–16)
MCH RBC QN AUTO: 25.1 PG (ref 26–34)
MCHC RBC AUTO-ENTMCNC: 31.1 G/DL (ref 32–36)
MCV RBC AUTO: 81 FL (ref 80–100)
NRBC BLD-RTO: 0 /100 WBCS (ref 0–0)
PLATELET # BLD AUTO: 468 X10*3/UL (ref 150–450)
RBC # BLD AUTO: 4.87 X10*6/UL (ref 4–5.2)
WBC # BLD AUTO: 8.6 X10*3/UL (ref 4.4–11.3)

## 2024-11-16 PROCEDURE — 82728 ASSAY OF FERRITIN: CPT

## 2024-11-16 PROCEDURE — 36415 COLL VENOUS BLD VENIPUNCTURE: CPT

## 2024-11-16 PROCEDURE — 85027 COMPLETE CBC AUTOMATED: CPT

## 2024-11-16 PROCEDURE — 84443 ASSAY THYROID STIM HORMONE: CPT

## 2024-11-17 LAB
FERRITIN SERPL-MCNC: 12 NG/ML (ref 8–150)
TSH SERPL-ACNC: 1.81 MIU/L (ref 0.44–3.98)

## 2024-11-19 DIAGNOSIS — D69.6 THROMBOCYTOPENIA (CMS-HCC): Primary | ICD-10-CM

## 2024-11-19 NOTE — RESULT ENCOUNTER NOTE
Blood counts are normal including white blood cells and there is no anemia. Elevated platelet count minimally, recheck in 3 months.    Thyroid function normal    Low normal ferritin level (iron storage), are you taking an iron supplement currently?

## 2024-12-01 DIAGNOSIS — N92.0 MENORRHAGIA WITH REGULAR CYCLE: ICD-10-CM

## 2024-12-02 RX ORDER — DROSPIRENONE AND ETHINYL ESTRADIOL 0.03MG-3MG
1 KIT ORAL DAILY
Qty: 112 TABLET | Refills: 1 | Status: SHIPPED | OUTPATIENT
Start: 2024-12-02

## 2025-02-18 DIAGNOSIS — N92.0 MENORRHAGIA WITH REGULAR CYCLE: ICD-10-CM

## 2025-02-18 RX ORDER — DROSPIRENONE AND ETHINYL ESTRADIOL 0.03MG-3MG
1 KIT ORAL DAILY
Qty: 112 TABLET | Refills: 1 | Status: SHIPPED | OUTPATIENT
Start: 2025-02-18

## 2025-05-22 DIAGNOSIS — N92.0 MENORRHAGIA WITH REGULAR CYCLE: ICD-10-CM

## 2025-05-22 RX ORDER — DROSPIRENONE AND ETHINYL ESTRADIOL 0.03MG-3MG
1 KIT ORAL DAILY
Qty: 112 TABLET | Refills: 2 | Status: SHIPPED | OUTPATIENT
Start: 2025-05-22 | End: 2025-05-23

## 2025-05-23 RX ORDER — DROSPIRENONE AND ETHINYL ESTRADIOL 0.03MG-3MG
1 KIT ORAL DAILY
Qty: 112 TABLET | Refills: 2 | Status: SHIPPED | OUTPATIENT
Start: 2025-05-23

## 2025-08-05 ENCOUNTER — APPOINTMENT (OUTPATIENT)
Dept: PRIMARY CARE | Facility: CLINIC | Age: 45
End: 2025-08-05
Payer: COMMERCIAL

## 2025-08-06 ENCOUNTER — APPOINTMENT (OUTPATIENT)
Dept: PRIMARY CARE | Facility: CLINIC | Age: 45
End: 2025-08-06
Payer: COMMERCIAL

## 2025-08-06 VITALS
DIASTOLIC BLOOD PRESSURE: 76 MMHG | BODY MASS INDEX: 31.48 KG/M2 | HEIGHT: 64 IN | RESPIRATION RATE: 14 BRPM | HEART RATE: 86 BPM | OXYGEN SATURATION: 97 % | TEMPERATURE: 97 F | WEIGHT: 184.4 LBS | SYSTOLIC BLOOD PRESSURE: 111 MMHG

## 2025-08-06 DIAGNOSIS — D75.839 THROMBOCYTOSIS: ICD-10-CM

## 2025-08-06 DIAGNOSIS — N92.0 MENORRHAGIA WITH REGULAR CYCLE: ICD-10-CM

## 2025-08-06 DIAGNOSIS — E66.811 CLASS 1 OBESITY DUE TO EXCESS CALORIES WITHOUT SERIOUS COMORBIDITY WITH BODY MASS INDEX (BMI) OF 31.0 TO 31.9 IN ADULT: ICD-10-CM

## 2025-08-06 DIAGNOSIS — E66.09 CLASS 1 OBESITY DUE TO EXCESS CALORIES WITHOUT SERIOUS COMORBIDITY WITH BODY MASS INDEX (BMI) OF 31.0 TO 31.9 IN ADULT: ICD-10-CM

## 2025-08-06 DIAGNOSIS — Z00.00 ENCOUNTER FOR WELLNESS EXAMINATION IN ADULT: Primary | ICD-10-CM

## 2025-08-06 DIAGNOSIS — F51.04 PSYCHOPHYSIOLOGICAL INSOMNIA: ICD-10-CM

## 2025-08-06 PROCEDURE — 3008F BODY MASS INDEX DOCD: CPT | Performed by: FAMILY MEDICINE

## 2025-08-06 PROCEDURE — 99396 PREV VISIT EST AGE 40-64: CPT | Performed by: FAMILY MEDICINE

## 2025-08-06 PROCEDURE — 1036F TOBACCO NON-USER: CPT | Performed by: FAMILY MEDICINE

## 2025-08-06 ASSESSMENT — PROMIS GLOBAL HEALTH SCALE
RATE_PHYSICAL_HEALTH: GOOD
RATE_AVERAGE_PAIN: 0
RATE_QUALITY_OF_LIFE: VERY GOOD
EMOTIONAL_PROBLEMS: RARELY
RATE_AVERAGE_FATIGUE: MILD
RATE_GENERAL_HEALTH: GOOD
RATE_MENTAL_HEALTH: GOOD
RATE_SOCIAL_SATISFACTION: VERY GOOD
CARRYOUT_PHYSICAL_ACTIVITIES: COMPLETELY
CARRYOUT_SOCIAL_ACTIVITIES: VERY GOOD

## 2025-08-06 NOTE — PROGRESS NOTES
"Subjective   Patient ID: Candice Javier is a 44 y.o. female who presents for a wellness examination. The patient was made aware that AI (artificial intelligence) technology will be utilized during today's visit. The patient expressed understanding and verbally accepts the use of AI technology.     HPI   Immunizations: up to date  Cscope: none found (n/a)  Mammogram: 10/2024 (scheduled with high risk breast clinic)  Pap smear: 5/31/2023 negative HPV. TL for contraception, taking OCP.   [facility]: [facility]  Dentist: every 6mo  Vision: yearly     History of Present Illness  Deja Javier \"Hogn" is a 44 year old female who presents with concerns about birth control and menstrual management.    She is seeking advice on birth control methods primarily for emotional regulation and menstrual management, despite having had a tubal ligation. She desires to stop having periods altogether and is currently taking birth control every three to four months, which has reduced the heaviness and duration of her periods. She has a history of anemia with previous labs showing low normal ferritin levels and currently takes a one-a-day vitamin, uncertain if there is iron in it.    She experiences emotional changes and irritability around her menstrual cycle, as noted by her .    She reports difficulty with sleep, often waking up at 4 AM and struggling to fall asleep initially. She does not take any medication for sleep. No anxiety or stress is reported. She consumes one cup of coffee daily and alcohol once or twice a week.    Her physical activity includes playing coed softball once a week and occasional walks. Her diet and appetite are reported as okay.    She is up to date with her vaccinations, dental, and vision exams, and has scheduled a mammogram for October. She sees a dermatologist at [facility] in [place] and has no current skin concerns.    Review of Systems   All other systems reviewed and are negative.    Objective   BP " "111/76 (BP Location: Right arm, Patient Position: Sitting, BP Cuff Size: Large adult)   Pulse 86   Temp 36.1 °C (97 °F) (Temporal)   Resp 14   Ht 1.626 m (5' 4\")   Wt 83.6 kg (184 lb 6.4 oz)   SpO2 97%   BMI 31.65 kg/m²     Physical Exam  Constitutional: Well developed, well nourished, alert and in no acute distress.  Head and Face: Normocephalic, atraumatic.  Eyes: Normal external exam. Pupils equally round and reactive to light with normal accommodation and extraocular movements intact.   ENT: External inspection of ears normal, tympanic membranes visualized and normal. Nasal mucosa, septum, and turbinates normal. Oral mucosa moist, oropharynx clear.   Neck: Supple, no lymphadenopathy or masses. Thyroid not enlarged, no palpable nodules.   Cardiovascular: Regular rate and rhythm, normal S1 and S2, no murmurs, gallops, or rubs. Radial pulses normal. No peripheral edema. No carotid bruits.   Pulmonary: No respiratory distress, lungs clear to auscultation bilaterally. No wheezes, rhonchi, rales.   Breasts: declined   Abdomen: Soft, nontender, nondistended, normal bowel sounds. No masses palpated.   Musculoskeletal: Gait normal. Muscle strength/tone normal of all 4 extremities. Normal range of motion of all extremities.   Skin: Warm, well perfused, normal skin turgor and color, no lesions or rashes noted.   Neurologic: Cranial nerves II-XII grossly intact. Deep tendon reflexes were 2+ and symmetric. Sensation normal bilaterally.   Psychiatric: Mood calm and affect normal.    Assessment/Plan   Recommendations for women annual wellness exam:   Make sure screenings for cervical and breast cancer are up to date if applicable- pap smears age 21-65-up to date.  Discuss mammogram starting at age 40 or sooner if positive family history of breast cancer   STD screening   Follow a healthy diet (Dash diet, Mediterranean diet)  Exercise 150 min/wk   Maintain healthy weight (BMI < 25)-your BMI is 31.  Do not smoke   Alcohol " in moderation (up to 1 drink/day)  Get enough sleep (7-8 hours/night)  Take a prenatal vitamin with folic acid if possibility of pregnancy   Make sure immunizations are up to date (influenza, Tdap)-up to date   Premenopausal women need minimum 1,000 mg calcium and 600-800 IU vitamin D daily (combination of diet + supplement)  Talk to your physician if you have concerns about depression or anxiety  Visit dentist twice yearly  Colon Cancer Screening-n/a    Assessment & Plan  - Order updated blood work to check ferritin, iron levels, and platelet count.  - Continue current oral contraceptive regimen with the option for continuous use to avoid periods.  - Discuss potential spotting with continuous oral contraceptive use.    History of iron deficiency anemia  Previously anemic with low normal ferritin levels. Currently not taking iron supplements, only a one-a-day vitamin.  - Order updated blood work to check ferritin and iron levels.    Insomnia  Struggles with falling asleep and occasionally wakes up early. Does not take any medication for sleep.  - Recommend Sleepy Time Tea with chamomile for relaxation before bed.  - Recommend magnesium oxide, gluconate, or glycereth 500 mg at night to aid sleep.       Follow up in 1 year for CPE

## 2025-08-14 ENCOUNTER — TELEPHONE (OUTPATIENT)
Dept: PRIMARY CARE | Facility: CLINIC | Age: 45
End: 2025-08-14
Payer: COMMERCIAL

## 2025-08-28 ENCOUNTER — TELEPHONE (OUTPATIENT)
Dept: PRIMARY CARE | Facility: CLINIC | Age: 45
End: 2025-08-28
Payer: COMMERCIAL

## 2025-08-29 DIAGNOSIS — N92.0 MENORRHAGIA WITH REGULAR CYCLE: ICD-10-CM

## 2025-08-29 RX ORDER — DROSPIRENONE AND ETHINYL ESTRADIOL 0.03MG-3MG
1 KIT ORAL DAILY
Qty: 112 TABLET | Refills: 2 | Status: SHIPPED | OUTPATIENT
Start: 2025-08-29

## 2026-08-05 ENCOUNTER — APPOINTMENT (OUTPATIENT)
Dept: PRIMARY CARE | Facility: CLINIC | Age: 46
End: 2026-08-05
Payer: COMMERCIAL